# Patient Record
Sex: FEMALE | Race: WHITE
[De-identification: names, ages, dates, MRNs, and addresses within clinical notes are randomized per-mention and may not be internally consistent; named-entity substitution may affect disease eponyms.]

---

## 2020-12-24 ENCOUNTER — HOSPITAL ENCOUNTER (EMERGENCY)
Dept: HOSPITAL 76 - ED | Age: 65
Discharge: HOME | End: 2020-12-24
Payer: COMMERCIAL

## 2020-12-24 VITALS — DIASTOLIC BLOOD PRESSURE: 83 MMHG | SYSTOLIC BLOOD PRESSURE: 162 MMHG

## 2020-12-24 DIAGNOSIS — M41.9: ICD-10-CM

## 2020-12-24 DIAGNOSIS — G35: ICD-10-CM

## 2020-12-24 DIAGNOSIS — Z20.828: ICD-10-CM

## 2020-12-24 DIAGNOSIS — R06.02: Primary | ICD-10-CM

## 2020-12-24 LAB
ALBUMIN DIAFP-MCNC: 4.8 G/DL (ref 3.2–5.5)
ALBUMIN/GLOB SERPL: 1.8 {RATIO} (ref 1–2.2)
ALP SERPL-CCNC: 66 IU/L (ref 42–121)
ALT SERPL W P-5'-P-CCNC: 20 IU/L (ref 10–60)
ANION GAP SERPL CALCULATED.4IONS-SCNC: 9 MMOL/L (ref 6–13)
AST SERPL W P-5'-P-CCNC: 25 IU/L (ref 10–42)
BASOPHILS NFR BLD AUTO: 0 10^3/UL (ref 0–0.1)
BASOPHILS NFR BLD AUTO: 0.6 %
BILIRUB BLD-MCNC: 0.4 MG/DL (ref 0.2–1)
BUN SERPL-MCNC: 16 MG/DL (ref 6–20)
CALCIUM UR-MCNC: 9.4 MG/DL (ref 8.5–10.3)
CHLORIDE SERPL-SCNC: 99 MMOL/L (ref 101–111)
CLARITY UR REFRACT.AUTO: CLEAR
CO2 SERPL-SCNC: 29 MMOL/L (ref 21–32)
CREAT SERPLBLD-SCNC: 0.6 MG/DL (ref 0.4–1)
EOSINOPHIL # BLD AUTO: 0.4 10^3/UL (ref 0–0.7)
EOSINOPHIL NFR BLD AUTO: 5.7 %
ERYTHROCYTE [DISTWIDTH] IN BLOOD BY AUTOMATED COUNT: 13 % (ref 12–15)
GLOBULIN SER-MCNC: 2.7 G/DL (ref 2.1–4.2)
GLUCOSE SERPL-MCNC: 113 MG/DL (ref 70–100)
GLUCOSE UR QL STRIP.AUTO: NEGATIVE MG/DL
HGB UR QL STRIP: 13.6 G/DL (ref 12–16)
KETONES UR QL STRIP.AUTO: NEGATIVE MG/DL
LIPASE SERPL-CCNC: 24 U/L (ref 22–51)
LYMPHOCYTES # SPEC AUTO: 1 10^3/UL (ref 1.5–3.5)
LYMPHOCYTES NFR BLD AUTO: 16.4 %
MCH RBC QN AUTO: 29.5 PG (ref 27–31)
MCHC RBC AUTO-ENTMCNC: 32.2 G/DL (ref 32–36)
MCV RBC AUTO: 91.5 FL (ref 81–99)
MONOCYTES # BLD AUTO: 0.4 10^3/UL (ref 0–1)
MONOCYTES NFR BLD AUTO: 6.5 %
NEUTROPHILS # BLD AUTO: 4.5 10^3/UL (ref 1.5–6.6)
NEUTROPHILS # SNV AUTO: 6.3 X10^3/UL (ref 4.8–10.8)
NEUTROPHILS NFR BLD AUTO: 70.6 %
NITRITE UR QL STRIP.AUTO: NEGATIVE
PDW BLD AUTO: 9.8 FL (ref 7.9–10.8)
PH UR STRIP.AUTO: 6 PH (ref 5–7.5)
PLATELET # BLD: 241 10^3/UL (ref 130–450)
PROT SPEC-MCNC: 7.5 G/DL (ref 6.7–8.2)
PROT UR STRIP.AUTO-MCNC: NEGATIVE MG/DL
RBC # UR STRIP.AUTO: (no result) /UL
RBC # URNS HPF: (no result) /HPF (ref 0–5)
RBC MAR: 4.61 10^6/UL (ref 4.2–5.4)
SODIUM SERPLBLD-SCNC: 137 MMOL/L (ref 135–145)
SP GR UR STRIP.AUTO: 1.01 (ref 1–1.03)
SQUAMOUS URNS QL MICRO: (no result)
UROBILINOGEN UR QL STRIP.AUTO: (no result) E.U./DL
UROBILINOGEN UR STRIP.AUTO-MCNC: NEGATIVE MG/DL

## 2020-12-24 PROCEDURE — 85025 COMPLETE CBC W/AUTO DIFF WBC: CPT

## 2020-12-24 PROCEDURE — 84484 ASSAY OF TROPONIN QUANT: CPT

## 2020-12-24 PROCEDURE — 36415 COLL VENOUS BLD VENIPUNCTURE: CPT

## 2020-12-24 PROCEDURE — 80053 COMPREHEN METABOLIC PANEL: CPT

## 2020-12-24 PROCEDURE — 81001 URINALYSIS AUTO W/SCOPE: CPT

## 2020-12-24 PROCEDURE — 83880 ASSAY OF NATRIURETIC PEPTIDE: CPT

## 2020-12-24 PROCEDURE — 99284 EMERGENCY DEPT VISIT MOD MDM: CPT

## 2020-12-24 PROCEDURE — 93005 ELECTROCARDIOGRAM TRACING: CPT

## 2020-12-24 PROCEDURE — 83690 ASSAY OF LIPASE: CPT

## 2020-12-24 NOTE — XRAY REPORT
PROCEDURE:  Chest 1 View X-Ray

 

INDICATIONS:  Chest Pain

 

TECHNIQUE:  One view of the chest was acquired.  

 

COMPARISON:  None

 

FINDINGS:  

 

Surgical changes and devices: Thoracic Woodson rods are in place..  

 

Lungs and pleura:  No pleural effusions or pneumothorax.  Lungs are clear.  

 

Mediastinum:  Mediastinal contours appear normal.  Heart size is normal.  

 

Bones and chest wall:  No suspicious bony lesions.  Moderate thoracic scoliosis. Overlying soft tissu
es appear unremarkable.  

 

IMPRESSION:  

No acute cardiopulmonary disease.

 

Reviewed by: Chanelle Solis MD on 12/24/2020 3:41 PM PST

Approved by: Chanelle Solis MD on 12/24/2020 3:41 PM PST

 

 

Station ID:  [id number]

## 2020-12-24 NOTE — ED PHYSICIAN DOCUMENTATION
History of Present Illness





- Stated complaint


Stated Complaint: SOA





- Chief complaint


Chief Complaint: Resp





- History obtained from


History obtained from: Patient





- Additonal information


Additional information: 


65-year-old female who has an underlying history of multiple sclerosis as well 

as scoliosis status post rodding of her spine presents to the emergency 

department with acute onset shortness of air and a feeling of warming in her 

body but no active fevers.  She denies any cough or congestion.  No chest pain 

no leg swelling abdominal pain or dysuria.





She does report that she is followed by Dr. Rush the neurologist in [place].  

Her MS is described as mild and she is only on gabapentin 100 mg 3 times a day. 

She does report that she is being managed for persistent spinal pain secondary t

o the scoliosis and takes buprenorphine 2 mg sublingually 4 times a day.





Patient denies that she has numbness, tingling or weakness in her arms or legs. 

She does not feel that the symptoms are consistent with previous MS flares.





Pt denies any history of hypertension, coronary artery disease, blood clots or 

cancer.  She is not a smoker.  Nondrinker.








Review of Systems


Constitutional: denies: Fever, Chills, Myalgias


Eyes: reports: Reviewed and negative


Ears: reports: Reviewed and negative


Nose: reports: Reviewed and negative


Throat: reports: Reviewed and negative


Cardiac: reports: Reviewed and negative


Respiratory: reports: Dyspnea.  denies: Cough, Hemoptysis, Wheezing


GI: reports: Reviewed and negative


: reports: Reviewed and negative


Skin: reports: Reviewed and negative


Musculoskeletal: reports: Back pain (chronic; hx of scolosis)


Neurologic: reports: Reviewed and negative





PD PAST MEDICAL HISTORY





- Past Medical History


Endocrine/Autoimmune: HyPOthyroidism


Musculoskeletal: Scoliosis





- Past Surgical History


Past Surgical History: Yes


Ortho: Arthroscopic surgery, Spine surgery


/GYN:  section, Hysterectomy





- Present Medications


Home Medications: 


                                Ambulatory Orders











 Medication  Instructions  Recorded  Confirmed


 


Diazepam 5 mg PO Q6-8H PRN 10/26/13 10/26/13


 


Hydroxyzine Pamoate 25 mg PO DAILY 10/26/13 10/26/13


 


Levothyroxine Sodium [Synthroid] 200 mcg PO DAILY 10/26/13 10/26/13


 


Oxycodone HCl/Acetaminophen 1 each PO DAILY 10/26/13 10/26/13





[Percocet  mg Tablet]   


 


Promethazine HCl 25 mg PO 10/26/13 10/26/13














- Allergies


Allergies/Adverse Reactions: 


                                    Allergies











Allergy/AdvReac Type Severity Reaction Status Date / Time


 


meloxicam [From [facility]] Allergy Intermediate Rash Verified 20 15:01














- Social History


Does the pt smoke?: No


Smoking Status: Never smoker


Does the pt drink ETOH?: Yes


Does the pt have substance abuse?: No





- Immunizations


Immunizations are current?: Yes





PD ED PE EXPANDED





- General


General: Alert, No acute distress, Well developed/nourished





- Neck


Neck: Supple w/out meningeal sx, No tenderness.  No: Adenopathy





- Cardiac


Cardiac: Regular Rate, Regular Rhythm, Radial strong equal, Pedal strong equal, 

Cap refill < 2 sec.  No: Murmur Present





- Respiratory


Respiratory: Clear to ausultation zaira.  No: Distress, Labored





- Abdomen


Abdomen: Normal Bowel sounds.  No: Tender to palpation





- Back


Back: Other (Long midline thoracic lumbar spinal scar well-healed.  No swelling 

or ecchymosis.)





- Extremities


Extremities: Normal.  No: Deformity, Tenderness





- Neuro


Neuro: Alert and Oriented X 3, CNII-XII intact





- GCS


Eye Opening: Spontaneous


Motor: Obeys Commands


Verbal: Oriented


Total: 15





Results





- Vitals


Vitals: 


                               Vital Signs - 24 hr











  20





  14:58 15:43 15:51


 


Temperature 36.3 C L  


 


Heart Rate 73 67 


 


Respiratory 17 14 





Rate   


 


Blood Pressure 168/69 H 158/71 H 170/73 H


 


O2 Saturation 99 98 














  20





  16:00 16:30 17:00


 


Temperature   


 


Heart Rate 61 64 70


 


Respiratory 19 17 17





Rate   


 


Blood Pressure 145/73 H 149/63 H 162/83 H


 


O2 Saturation 98 98 98








                                     Oxygen











O2 Source                      Room air

















- EKG (time done)


  ** 1517


Rate: Rate (enter#) (65)


Rhythm: NSR


Axis: Normal


Intervals: Normal VA


Ischemia: Q waves (>30mS V2-V5)


Compare to prior EKG: Old EKG unavailable


Computer interpretation: Agree with computer





- Labs


Labs: 


                                Laboratory Tests











  20





  15:20 15:20 15:20


 


WBC  6.3  


 


RBC  4.61  


 


Hgb  13.6  


 


Hct  42.2  


 


MCV  91.5  


 


MCH  29.5  


 


MCHC  32.2  


 


RDW  13.0  


 


Plt Count  241  


 


MPV  9.8  


 


Neut # (Auto)  4.5  


 


Lymph # (Auto)  1.0 L  


 


Mono # (Auto)  0.4  


 


Eos # (Auto)  0.4  


 


Baso # (Auto)  0.0  


 


Absolute Nucleated RBC  0.00  


 


Nucleated RBC %  0.0  


 


Sodium   137 


 


Potassium   3.5 


 


Chloride   99 L 


 


Carbon Dioxide   29 


 


Anion Gap   9.0 


 


BUN   16 


 


Creatinine   0.6 


 


Estimated GFR (MDRD)   101 


 


Glucose   113 H 


 


Calcium   9.4 


 


Total Bilirubin   0.4 


 


AST   25 


 


ALT   20 


 


Alkaline Phosphatase   66 


 


Troponin I High Sens    2.4


 


B-Natriuretic Peptide   


 


Total Protein   7.5 


 


Albumin   4.8 


 


Globulin   2.7 


 


Albumin/Globulin Ratio   1.8 


 


Lipase   24 


 


Urine Color   


 


Urine Clarity   


 


Urine pH   


 


Ur Specific Gravity   


 


Urine Protein   


 


Urine Glucose (UA)   


 


Urine Ketones   


 


Urine Occult Blood   


 


Urine Nitrite   


 


Urine Bilirubin   


 


Urine Urobilinogen   


 


Ur Leukocyte Esterase   


 


Urine RBC   


 


Urine WBC   


 


Ur Squamous Epith Cells   


 


Urine Bacteria   














  20





  15:20 15:50


 


WBC  


 


RBC  


 


Hgb  


 


Hct  


 


MCV  


 


MCH  


 


MCHC  


 


RDW  


 


Plt Count  


 


MPV  


 


Neut # (Auto)  


 


Lymph # (Auto)  


 


Mono # (Auto)  


 


Eos # (Auto)  


 


Baso # (Auto)  


 


Absolute Nucleated RBC  


 


Nucleated RBC %  


 


Sodium  


 


Potassium  


 


Chloride  


 


Carbon Dioxide  


 


Anion Gap  


 


BUN  


 


Creatinine  


 


Estimated GFR (MDRD)  


 


Glucose  


 


Calcium  


 


Total Bilirubin  


 


AST  


 


ALT  


 


Alkaline Phosphatase  


 


Troponin I High Sens  


 


B-Natriuretic Peptide  53 


 


Total Protein  


 


Albumin  


 


Globulin  


 


Albumin/Globulin Ratio  


 


Lipase  


 


Urine Color   YELLOW


 


Urine Clarity   CLEAR


 


Urine pH   6.0


 


Ur Specific Gravity   1.010


 


Urine Protein   NEGATIVE


 


Urine Glucose (UA)   NEGATIVE


 


Urine Ketones   NEGATIVE


 


Urine Occult Blood   SMALL H


 


Urine Nitrite   NEGATIVE


 


Urine Bilirubin   NEGATIVE


 


Urine Urobilinogen   0.2 (NORMAL)


 


Ur Leukocyte Esterase   NEGATIVE


 


Urine RBC   6-10 H


 


Urine WBC   0-3


 


Ur Squamous Epith Cells   RARE Squamous


 


Urine Bacteria   None Seen














- Rads (name of study)


  ** CXR


Radiology: Final report received (No acute cardiopulmonary process)





PD MEDICAL DECISION MAKING





- ED course


Complexity details: reviewed results, re-evaluated patient, considered 

differential, d/w patient


ED course: 


65-year-old female presents the emergency department with chief complaint of 

acute shortness of air.  She had no hypoxia.  Chest x-ray was unremarkable for 

focal pneumonia.  EKG was nonischemic.  High-sensitivity troponin as well as BNP

unremarkable.  No anemia or significant electrolyte abnormality.  By Wells 

criteria the suspicion for acute PE would be extremely low.  However without an 

alternative diagnosis for acute shortness of breath I had ordered a CT pulmonary

angio to rule out a PE.  However the patient was quite uncomfortable with the 

idea of a CT scan with contrast though she does not have a history of contrast 

allergy. At this time without any intervention here in the emergency department 

her shortness of air has resolved.  She is requesting to be discharged home.  

Though not necessarily AGAINST MEDICAL ADVICE she was notified that if at any 

point she felt her symptoms were worsening to please return immediately to the 

emergency department for repeat evaluation.  Patient feels that her shortness of

air is related to her worsening back pain and scoliosis.  She did take her boot 

the nephron which she feels may have improved her symptoms








Departure





- Departure


Disposition: 01 Home, Self Care


Clinical Impression: 


 Shortness of breath





Condition: Stable


Record reviewed to determine appropriate education?: Yes


Comments: 


Rachelle chavez are seen today for shortness of air.  Here in the emergency 

department all your labs including your heart lab was normal.  Your chest x-ray 

did not show any pneumonia.  Your vital signs were also relatively stable.  Your

oxygen levels were normal.





We did discuss doing the CT angio of your chest to rule out a blood clot in your

lungs.  You have declined to do this procedure.  You were feeling better despite

no interventions here in the emergency department.





If at any point you feel that your symptoms are worsening please return 

immediately to the emergency department

## 2021-07-10 NOTE — ED PHYSICIAN DOCUMENTATION
PD HPI ABD PAIN





- Stated complaint


Stated Complaint: ABD PAIN





- Chief complaint


Chief Complaint: Abd Pain





- History obtained from


History obtained from: Patient





- Additional information


Additional information: 





66-year-old woman with chronic pain due to multiple sclerosis and scoliosis 

presents with a week and a half of constant upper and central abdominal pain 

that is worse with eating.  It is a burning sensation.  She was seen at [facility] 

a week and a half ago for same and had blood work was done which was normal.  

Urinalysis showed hematuria so she had a noncontrast CT which was negative.  She

has had intermittent relief with Pepto-Bismol.  Her bowel movements are 

irregular but that is her baseline due to the pain medication.





Review of Systems


Ten Systems: 10 systems reviewed and negative


Cardiac: reports: Reviewed and negative


Respiratory: reports: Reviewed and negative





PD PAST MEDICAL HISTORY





- Past Medical History


Endocrine/Autoimmune: HyPOthyroidism


Musculoskeletal: Scoliosis





- Past Surgical History


Past Surgical History: Yes


Ortho: Arthroscopic surgery, Spine surgery


/GYN:  section, Hysterectomy





- Present Medications


Home Medications: 


                                Ambulatory Orders











 Medication  Instructions  Recorded  Confirmed


 


Levothyroxine Sodium [Synthroid] 200 mcg PO DAILY 10/26/13 07/10/21


 


Oxycodone HCl/Acetaminophen 1 each PO DAILY 10/26/13 07/10/21





[Percocet  mg Tablet]   


 


Omeprazole [PriLOSEC] 1 cap PO DAILY 07/10/21 07/10/21


 


Omeprazole [PriLOSEC] 20 mg PO BID #60 07/10/21 


 


Oxycodone Myristate [Xtampza ER] 1 cap PO BID 07/10/21 07/10/21


 


Sucralfate [Carafate] 1 gm PO ACHS #60 tablet 07/10/21 














- Allergies


Allergies/Adverse Reactions: 


                                    Allergies











Allergy/AdvReac Type Severity Reaction Status Date / Time


 


meloxicam [From [facility]] Allergy Intermediate Rash Verified 07/10/21 18:03














- Social History


Does the pt smoke?: No


Smoking Status: Never smoker


Does the pt drink ETOH?: Yes


Does the pt have substance abuse?: No





- Immunizations


Immunizations are current?: Yes





PD ED PE NORMAL





- Vitals


Vital signs reviewed: Yes





- General


General: Alert and oriented X 3, No acute distress





- HEENT


HEENT: PERRL, EOMI





- Neck


Neck: Supple, no meningeal sign, No bony TTP





- Cardiac


Cardiac: RRR, No murmur





- Respiratory


Respiratory: No respiratory distress, Clear bilaterally





- Abdomen


Abdomen: Soft, Other (The hyperactive bowel tones, minimal diffuse tenderness wi

thout guarding or rebound, no specific tenderness anywhere including in the 

right upper quadrant.)





- Back


Back: No CVA TTP, No spinal TTP





- Extremities


Extremities: No edema, No calf tenderness / cord





- Neuro


Neuro: Alert and oriented X 3, Normal speech





Results





- Vitals


Vitals: 


                               Vital Signs - 24 hr











  07/10/21 07/10/21 07/10/21





  17:59 18:41 19:02


 


Temperature 36.6 C  


 


Heart Rate 81 70 75


 


Respiratory 18 19 19





Rate   


 


Blood Pressure 148/75 H 146/84 H 127/78


 


O2 Saturation 96 97 99














  07/10/21





  20:47


 


Temperature 36.8 C


 


Heart Rate 77


 


Respiratory 16





Rate 


 


Blood Pressure 153/74 H


 


O2 Saturation 98








                                     Oxygen











O2 Source                      Room air

















- Labs


Labs: 


                                Laboratory Tests











  07/10/21 07/10/21 07/10/21





  18:10 18:15 18:15


 


WBC   6.1 


 


RBC   4.60 


 


Hgb   13.7 


 


Hct   41.4 


 


MCV   90.0 


 


MCH   29.8 


 


MCHC   33.1 


 


RDW   12.9 


 


Plt Count   246 


 


MPV   9.3 


 


Neut # (Auto)   4.5 


 


Lymph # (Auto)   0.9 L 


 


Mono # (Auto)   0.7 


 


Eos # (Auto)   0.0 


 


Baso # (Auto)   0.0 


 


Absolute Nucleated RBC   0.00 


 


Nucleated RBC %   0.0 


 


Sodium    140


 


Potassium    3.6


 


Chloride    100 L


 


Carbon Dioxide    29


 


Anion Gap    11.0


 


BUN    15


 


Creatinine    0.5


 


Estimated GFR (MDRD)    124


 


Glucose    108 H


 


Calcium    9.7


 


Total Bilirubin    0.6


 


AST    20


 


ALT    18


 


Alkaline Phosphatase    62


 


Total Protein    7.4


 


Albumin    4.5


 


Globulin    2.9


 


Albumin/Globulin Ratio    1.6


 


Lipase    20 L


 


Urine Color  YELLOW  


 


Urine Clarity  CLEAR  


 


Urine pH  6.5  


 


Ur Specific Gravity  <=1.005  


 


Urine Protein  NEGATIVE  


 


Urine Glucose (UA)  NEGATIVE  


 


Urine Ketones  NEGATIVE  


 


Urine Occult Blood  SMALL H  


 


Urine Nitrite  NEGATIVE  


 


Urine Bilirubin  NEGATIVE  


 


Urine Urobilinogen  0.2 (NORMAL)  


 


Ur Leukocyte Esterase  NEGATIVE  


 


Urine RBC  0-5  


 


Urine WBC  0-3  


 


Ur Squamous Epith Cells  RARE Squamous  


 


Urine Bacteria  Rare  


 


Ur Microscopic Review  INDICATED  


 


Urine Culture Comments  NOT INDICATED  














PD MEDICAL DECISION MAKING





- ED course


ED course: 





66-year-old woman with upper abdominal pain, worse with eating, most consistent 

with gastritis/ulcer.  Examination is quite benign.  Labs are reassuring.  Had 

recent negative CT without contrast.  Had minimal relief with GI cocktail and a 

CT with contrast was ordered.


CT neg, o/w seems most c/w gastritis/gerd/pud.





Departure





- Departure


Disposition: 01 Home, Self Care


Clinical Impression: 


Gastritis


Qualifiers:


 Gastritis type: unspecified gastritis Chronicity: acute Gastritis bleeding: 

without bleeding Qualified Code(s): K29.00 - Acute gastritis without bleeding





Abdominal pain


Qualifiers:


 Abdominal location: epigastric Qualified Code(s): R10.13 - Epigastric pain





Condition: Good


Record reviewed to determine appropriate education?: Yes


Instructions:  ED Gastritis


Prescriptions: 


Sucralfate [Carafate] 1 gm PO ACHS #60 tablet


Omeprazole [PriLOSEC] 20 mg PO BID #60


Comments: 


As discussed, your CAT scan is normal.  I suspect this is a manifestation of an 

ulcer or gastritis.  Double your omeprazole to twice a day.  Add the Carafate,, 

but with the discussed warning that you cannot take it within an hour of your 

other medications.  Return for new or worsening symptoms.  Discuss referral for 

upper endoscopy with your physician.


Discharge Date/Time: 07/10/21 20:55

## 2021-07-10 NOTE — CT REPORT
PROCEDURE:  Abdomen/Pelvis W

 

INDICATIONS:  IV only, upper abd pain

 

CONTRAST:  IV CONTRAST: Optiray 320 ml: 100 PO CONTRAST: *NO PO CONTRAST 

 

TECHNIQUE:  

After the administration of IV contrast, 5 mm thick sections acquired from the diaphragms to the symp
hysis.  5 mm thick coronal and sagittal reformats were acquired.  For radiation dose reduction, the f
ollowing was used:  automated exposure control, adjustment of mA and/or kV according to patient size.
  

 

COMPARISON:  None.

 

FINDINGS:  

Image quality:  Excellent.  

 

ABDOMEN:  

Lung bases:  Lung bases are clear.  Heart size is normal.  

 

Solid organs:  Liver and spleen are normal in size and enhancement.  Gallbladder is mildly distended 
and otherwise within normal limits  Biliary system is non dilated.  Pancreas enhances normally.  No a
drenal nodules.  Kidneys demonstrate normal size and enhancement, without hydronephrosis.  

 

Peritoneum and bowel:  Bowel loops demonstrate normal wall thickness and caliber.  No free fluid or a
ir.  

 

Nodes and vessels:  No retroperitoneal or mesenteric adenopathy by size criteria.  Aorta and inferior
 vena cava are normal in size.  

 

Miscellaneous:  No ventral hernias.  

 

 

PELVIS:  

Genitourinary:  Bladder wall thickness is normal.  

 

Miscellaneous:  No inguinal hernias or adenopathy.  

 

Bones:  No suspicious bony lesions. Severe leftward curvature of the thoracolumbar spine. Thoracolumb
ar spinal hardware is present. No vertebral body compression fractures.  

 

IMPRESSION:  

1. No acute process. 

2. Appendix not seen. No evidence of appendicitis.

 

Reviewed by: Alana Gamino MD on 7/10/2021 8:17 PM PDT

Approved by: Alana Gamino MD on 7/10/2021 8:17 PM PDT

 

 

Station ID:  [id number]

## 2021-12-05 NOTE — XRAY REPORT
PROCEDURE:  Shoulder 2 View RT

 

INDICATIONS:  post reduction

 

TECHNIQUE:  2 views of the shoulder were acquired.  

 

COMPARISON:  12/5/2021.

 

FINDINGS:  

 

Bones: There is interval reduction of the right glenohumeral joint. No displaced or depressed fractur
e visualized on the current study. No suspicious bony lesions.  Visualized ribs appear intact. Spinal
 fixation rods partially visualized. 

 

Soft tissues:  No suspicious soft tissue calcifications.  

 

IMPRESSION:  

 

1. Interval reduction of the right glenohumeral joint.

 

Reviewed by: Stevie Villanueva MD on 12/5/2021 11:05 AM [facility]

Approved by: Stevie Villanueva MD on 12/5/2021 11:05 AM [facility]

 

 

Station ID:  [id number]

## 2021-12-05 NOTE — XRAY REPORT
PROCEDURE:  Shoulder 3 View RT

 

INDICATIONS:  injury/pain

 

TECHNIQUE:  3 views of the shoulder were acquired.  

 

COMPARISON:  None.

 

FINDINGS:  

 

Bones:  There is anterior dislocation of the right glenohumeral joint. No definite displaced fracture
 identified. There is mild flattening of the superolateral humeral head suggestive of a Hill-Sachs le
iglesia. No suspicious bony lesions.  Visualized ribs appear intact.  Posterior fixation rods are partia
lly visualized within the thoracic spine.

 

Soft tissues:  No suspicious soft tissue calcifications.  

 

IMPRESSION:  

 

1. Anterior dislocation of the right glenohumeral joint.

 

Reviewed by: Stevie Villanueva MD on 12/5/2021 9:40 AM [facility]

Approved by: Stevie Villanueva MD on 12/5/2021 9:40 AM [facility]

 

 

Station ID:  [id number]

## 2021-12-05 NOTE — ED PHYSICIAN DOCUMENTATION
PD HPI UPPER EXT INJURY





- Stated complaint


Stated Complaint: SHOULDER PX





- Chief complaint


Chief Complaint: Trauma Ext





- History obtained from


History obtained from: Patient





- Additonal information


Additional information: 


Patient comes emergency department chief complaint of pain in right shoulder.  

She states she was trying to put her robe on this morning and when she reached 

her arm back to put in the arm hole, she suddenly felt her shoulder "popped 

out".  Patient states this is happened many times before and in fact, happens 

about once a month.  Is been happening for years and patient states that it 

usually just pops back and on its own.  She has never seen an orthopedist for 

this.  States sometimes that she gets some numbness in her hand afterward.  

Patient states that this time, it did not pop back in on its own.  She states it

is hurting a lot.  No other complaints at this time.








Review of Systems


Ten Systems: 10 systems reviewed and negative


Constitutional: reports: Reviewed and negative


Eyes: reports: Reviewed and negative


Ears: reports: Reviewed and negative


Nose: reports: Reviewed and negative


Throat: reports: Reviewed and negative


Cardiac: reports: Reviewed and negative


Respiratory: reports: Reviewed and negative


GI: reports: Reviewed and negative


: reports: Reviewed and negative


Skin: reports: Reviewed and negative


Musculoskeletal: reports: Extremity pain, Joint pain


Neurologic: reports: Reviewed and negative


Psychiatric: reports: Reviewed and negative


Endocrine: reports: Reviewed and negative


Immunocompromised: reports: Reviewed and negative





PD PAST MEDICAL HISTORY





- Past Medical History


Past Medical History: Yes


Cardiovascular: None


Respiratory: None


Neuro: None


Endocrine/Autoimmune: HyPOthyroidism


GI: GERD


GYN: None


: None


HEENT: None


Psych: None


Musculoskeletal: Scoliosis


Derm: None





- Past Surgical History


Past Surgical History: Yes


Ortho: Knee replacement, Arthroscopic surgery, Spine surgery


/GYN:  section, Hysterectomy





- Present Medications


Home Medications: 


                                Ambulatory Orders











 Medication  Instructions  Recorded  Confirmed


 


Levothyroxine Sodium [Synthroid] 200 mcg PO DAILY 10/26/13 12/05/21


 


Oxycodone HCl/Acetaminophen 1 each ORAL Q4HR PRN 10/26/13 07/10/21





[Percocet  mg Tablet]   


 


Omeprazole [PriLOSEC] 20 mg PO BID #60 07/10/21 12/05/21


 


HYDROcod/ACETAM 5/325 [Norco 5/325] 1 - 2 tablet PO Q6H PRN #14 tablet 21 


 


Ondansetron Odt [Zofran] 4 mg TL Q6H PRN #10 tablet 21 


 


Oxycodone Myristate [Xtampza ER] 9 mg ORAL BID 21














- Allergies


Allergies/Adverse Reactions: 


                                    Allergies











Allergy/AdvReac Type Severity Reaction Status Date / Time


 


meloxicam [From Mobic] Allergy Intermediate Rash Verified 21 09:34














- Social History


Does the pt smoke?: No


Smoking Status: Never smoker


Does the pt drink ETOH?: Yes


Does the pt have substance abuse?: No





- Immunizations


Immunizations are current?: Yes





PD ED PE NORMAL





- Vitals


Vital signs reviewed: Yes





- General


General: Alert and oriented X 3, Well developed/nourished, Other (Patient is 

crying, but otherwise no apparent distress.)





- HEENT


HEENT: Atraumatic, PERRL, EOMI, Moist mucous membranes





- Neck


Neck: Supple, no meningeal sign, No bony TTP





- Cardiac


Cardiac: RRR, No murmur, Strong equal pulses





- Respiratory


Respiratory: No respiratory distress, Clear bilaterally





- Abdomen


Abdomen: Soft, Non tender, Non distended





- Derm


Derm: Normal color, Warm and dry, No rash





- Extremities


Extremities: No deformity, Other (Palpably dislocated right shoulder with caving

of glenoid fossa.  Limited range of motion secondary to pain and patient's 

anxiety level.  Patient does not allow any external rotation of the shoulder.)





- Neuro


Neuro: Alert and oriented X 3





- Psych


Psych: Normal mood, Normal affect





Results





- Vitals


Vitals: 


                               Vital Signs - 24 hr











  21





  09:35 10:44 11:20


 


Temperature 36.7 C  


 


Heart Rate 68 70 73


 


Respiratory 20 16 19





Rate   


 


Blood Pressure 186/73 H 175/86 H 184/77 H


 


O2 Saturation 100 100 100














  21





  11:33 11:37 11:42


 


Temperature   


 


Heart Rate 69 67 80


 


Respiratory 14 15 16





Rate   


 


Blood Pressure 159/85 H 160/133 H 


 


O2 Saturation 100 100 














  21





  11:43 12:37


 


Temperature  36.2 C L


 


Heart Rate 68 70


 


Respiratory 14 14





Rate  


 


Blood Pressure 166/73 H 141/69 H


 


O2 Saturation 100 100








                                     Oxygen











O2 Source                      Room air

















- Rads (name of study)


  ** Right shoulder x-ray


Radiology: Final report received, EMP read indepedently, See rad report 

(Anterior dislocation right shoulder)





  ** Right shoulder x-ray #2


Radiology: Final report received, EMP read indepedently, See rad report 

(Reduction of shoulder dislocation)





Procedures





- Reduction


Body part reduced: Right, Shoulder


Fracture or dislocation: Dislocation


Anesthesia: Dilaudid, Other (Fall)


Shoulder reduction technique: Traction - counter tract


Reduction aftercare: NV intact, Xray confirms reduction, Alignment improved, 

Sling, Patient tolerated well





- Procedural sedation


Sedation prep: Informed consent, Time out completed, Last meal, ASA 1 - healthy,

IV O2 monitor, ET CO2 monitor, RT present


Sedation Medications: propofol


Mallampati classification: I


Patient status during sedation: Unresponsive, Vitals remained stable, Maintained

airway, Recovered uneventfully


Sedation recovery: Recovered uneventfully, Back to baseline


Time in sedation (Minutes): 8





PD MEDICAL DECISION MAKING





- ED course


Complexity details: reviewed results, re-evaluated patient, considered 

differential, d/w patient


ED course: 


Patient's shoulder was reduced after x-ray confirmed dislocation.  Repeat x-ray 

confirmed successful reduction.  I have advised the patient that it is probably 

time for her to see an orthopedist regarding her increasingly frequent and 

severe dislocations.  I have given her the number for Dr. Monae's office and 

she should call first thing tomorrow, which is Monday.  Patient has been 

discharged with prescription for analgesia and is instructed to wear the sling 

but do very gentle range of motion exercises until she sees Dr. Monae.  We 

discussed the usual indications for return.








Departure





- Departure


Disposition: 01 Home, Self Care


Clinical Impression: 


Shoulder dislocation


Qualifiers:


 Encounter type: initial encounter Laterality: right Qualified Code(s): S43.004A

- Unspecified dislocation of right shoulder joint, initial encounter





Condition: Stable


Instructions:  ED Dislocation Shoulder Redu


Follow-Up: 


Chaitanya Monae MD [Provider Admit Priv/Credential] - 


Prescriptions: 


HYDROcod/ACETAM 5/325 [Norco 5/325] 1 - 2 tablet PO Q6H PRN #14 tablet


 PRN Reason: Pain


Ondansetron Odt [Zofran] 4 mg TL Q6H PRN #10 tablet


 PRN Reason: Nausea / Vomiting


Comments: 


X-rays today showed your right shoulder to be dislocated.  We have put this back

 in place today, though x-rays do show that you have a widened joint space and 

are at high risk of dislocating again.  Given that you have had multiple 

dislocations in recent months, this is not surprising, but does indicate that 

you need to follow-up with an orthopedist to discuss further options for your 

shoulder.  Until you see orthopedics, you should wear the sling.  You may take 

your arm out to do gentle range of motion exercises or bathe, but should be very

 careful with any motion that has been problematic for you before.  You may take

 the pain medication as needed, as well as the nausea medication.  These have 

been electronically transmitted to [facility] pharmacy in [place].  Please call 

first thing tomorrow morning to set up a follow-up appointment with orthopedics.


Discharge Date/Time: 21 12:37